# Patient Record
Sex: FEMALE | Employment: FULL TIME | URBAN - METROPOLITAN AREA
[De-identification: names, ages, dates, MRNs, and addresses within clinical notes are randomized per-mention and may not be internally consistent; named-entity substitution may affect disease eponyms.]

---

## 2018-05-29 ENCOUNTER — TRANSFERRED RECORDS (OUTPATIENT)
Dept: HEALTH INFORMATION MANAGEMENT | Facility: CLINIC | Age: 48
End: 2018-05-29

## 2018-06-06 ENCOUNTER — TRANSFERRED RECORDS (OUTPATIENT)
Dept: HEALTH INFORMATION MANAGEMENT | Facility: CLINIC | Age: 48
End: 2018-06-06

## 2018-07-17 RX ORDER — FLUTICASONE PROPIONATE 50 MCG
2 SPRAY, SUSPENSION (ML) NASAL DAILY PRN
COMMUNITY

## 2018-07-18 ENCOUNTER — ANESTHESIA EVENT (OUTPATIENT)
Dept: SURGERY | Facility: CLINIC | Age: 48
End: 2018-07-18
Payer: COMMERCIAL

## 2018-07-18 ENCOUNTER — HOSPITAL ENCOUNTER (OUTPATIENT)
Facility: CLINIC | Age: 48
Discharge: HOME | End: 2018-07-18
Attending: OBSTETRICS & GYNECOLOGY | Admitting: OBSTETRICS & GYNECOLOGY
Payer: COMMERCIAL

## 2018-07-18 ENCOUNTER — SURGERY (OUTPATIENT)
Age: 48
End: 2018-07-18

## 2018-07-18 ENCOUNTER — ANESTHESIA (OUTPATIENT)
Dept: SURGERY | Facility: CLINIC | Age: 48
End: 2018-07-18
Payer: COMMERCIAL

## 2018-07-18 VITALS
BODY MASS INDEX: 25.19 KG/M2 | HEIGHT: 68 IN | WEIGHT: 166.2 LBS | SYSTOLIC BLOOD PRESSURE: 121 MMHG | DIASTOLIC BLOOD PRESSURE: 76 MMHG | OXYGEN SATURATION: 99 % | TEMPERATURE: 97.6 F | RESPIRATION RATE: 16 BRPM

## 2018-07-18 DIAGNOSIS — G89.18 POST-OP PAIN: Primary | ICD-10-CM

## 2018-07-18 LAB
ABO + RH BLD: NORMAL
ABO + RH BLD: NORMAL
B-HCG SERPL-ACNC: 1 IU/L (ref 0–5)
BLD GP AB SCN SERPL QL: NORMAL
BLOOD BANK CMNT PATIENT-IMP: NORMAL
HGB BLD-MCNC: 12 G/DL (ref 11.7–15.7)
SPECIMEN EXP DATE BLD: NORMAL

## 2018-07-18 PROCEDURE — 36415 COLL VENOUS BLD VENIPUNCTURE: CPT | Performed by: OBSTETRICS & GYNECOLOGY

## 2018-07-18 PROCEDURE — 88305 TISSUE EXAM BY PATHOLOGIST: CPT | Performed by: OBSTETRICS & GYNECOLOGY

## 2018-07-18 PROCEDURE — 84702 CHORIONIC GONADOTROPIN TEST: CPT | Performed by: OBSTETRICS & GYNECOLOGY

## 2018-07-18 PROCEDURE — 25000132 ZZH RX MED GY IP 250 OP 250 PS 637: Performed by: ANESTHESIOLOGY

## 2018-07-18 PROCEDURE — 86850 RBC ANTIBODY SCREEN: CPT | Performed by: OBSTETRICS & GYNECOLOGY

## 2018-07-18 PROCEDURE — 27210794 ZZH OR GENERAL SUPPLY STERILE: Performed by: OBSTETRICS & GYNECOLOGY

## 2018-07-18 PROCEDURE — 36000087 ZZH SURGERY LEVEL 8 EA 15 ADDTL MIN: Performed by: OBSTETRICS & GYNECOLOGY

## 2018-07-18 PROCEDURE — 36000085 ZZH SURGERY LEVEL 8 1ST 30 MIN: Performed by: OBSTETRICS & GYNECOLOGY

## 2018-07-18 PROCEDURE — 25000125 ZZHC RX 250: Performed by: NURSE ANESTHETIST, CERTIFIED REGISTERED

## 2018-07-18 PROCEDURE — 37000009 ZZH ANESTHESIA TECHNICAL FEE, EACH ADDTL 15 MIN: Performed by: OBSTETRICS & GYNECOLOGY

## 2018-07-18 PROCEDURE — 71000012 ZZH RECOVERY PHASE 1 LEVEL 1 FIRST HR: Performed by: OBSTETRICS & GYNECOLOGY

## 2018-07-18 PROCEDURE — 25000566 ZZH SEVOFLURANE, EA 15 MIN: Performed by: OBSTETRICS & GYNECOLOGY

## 2018-07-18 PROCEDURE — 27210995 ZZH RX 272: Performed by: OBSTETRICS & GYNECOLOGY

## 2018-07-18 PROCEDURE — 25000128 H RX IP 250 OP 636: Performed by: OBSTETRICS & GYNECOLOGY

## 2018-07-18 PROCEDURE — 71000027 ZZH RECOVERY PHASE 2 EACH 15 MINS: Performed by: OBSTETRICS & GYNECOLOGY

## 2018-07-18 PROCEDURE — 25000128 H RX IP 250 OP 636: Performed by: NURSE ANESTHETIST, CERTIFIED REGISTERED

## 2018-07-18 PROCEDURE — 85018 HEMOGLOBIN: CPT | Performed by: OBSTETRICS & GYNECOLOGY

## 2018-07-18 PROCEDURE — 86901 BLOOD TYPING SEROLOGIC RH(D): CPT | Performed by: OBSTETRICS & GYNECOLOGY

## 2018-07-18 PROCEDURE — 40000170 ZZH STATISTIC PRE-PROCEDURE ASSESSMENT II: Performed by: OBSTETRICS & GYNECOLOGY

## 2018-07-18 PROCEDURE — 86900 BLOOD TYPING SEROLOGIC ABO: CPT | Performed by: OBSTETRICS & GYNECOLOGY

## 2018-07-18 PROCEDURE — 88305 TISSUE EXAM BY PATHOLOGIST: CPT | Mod: 26 | Performed by: OBSTETRICS & GYNECOLOGY

## 2018-07-18 PROCEDURE — 71000013 ZZH RECOVERY PHASE 1 LEVEL 1 EA ADDTL HR: Performed by: OBSTETRICS & GYNECOLOGY

## 2018-07-18 PROCEDURE — 25800025 ZZH RX 258: Performed by: OBSTETRICS & GYNECOLOGY

## 2018-07-18 PROCEDURE — 25000125 ZZHC RX 250: Performed by: OBSTETRICS & GYNECOLOGY

## 2018-07-18 PROCEDURE — 37000008 ZZH ANESTHESIA TECHNICAL FEE, 1ST 30 MIN: Performed by: OBSTETRICS & GYNECOLOGY

## 2018-07-18 PROCEDURE — 25000128 H RX IP 250 OP 636: Performed by: SURGERY

## 2018-07-18 PROCEDURE — C1765 ADHESION BARRIER: HCPCS | Performed by: OBSTETRICS & GYNECOLOGY

## 2018-07-18 RX ORDER — ONDANSETRON 2 MG/ML
INJECTION INTRAMUSCULAR; INTRAVENOUS PRN
Status: DISCONTINUED | OUTPATIENT
Start: 2018-07-18 | End: 2018-07-18

## 2018-07-18 RX ORDER — DEXAMETHASONE SODIUM PHOSPHATE 4 MG/ML
INJECTION, SOLUTION INTRA-ARTICULAR; INTRALESIONAL; INTRAMUSCULAR; INTRAVENOUS; SOFT TISSUE PRN
Status: DISCONTINUED | OUTPATIENT
Start: 2018-07-18 | End: 2018-07-18

## 2018-07-18 RX ORDER — FENTANYL CITRATE 50 UG/ML
25-50 INJECTION, SOLUTION INTRAMUSCULAR; INTRAVENOUS
Status: DISCONTINUED | OUTPATIENT
Start: 2018-07-18 | End: 2018-07-18 | Stop reason: HOSPADM

## 2018-07-18 RX ORDER — ATOVAQUONE AND PROGUANIL HYDROCHLORIDE 250; 100 MG/1; MG/1
1 TABLET, FILM COATED ORAL DAILY
COMMUNITY

## 2018-07-18 RX ORDER — KETOROLAC TROMETHAMINE 30 MG/ML
30 INJECTION, SOLUTION INTRAMUSCULAR; INTRAVENOUS EVERY 6 HOURS PRN
Status: DISCONTINUED | OUTPATIENT
Start: 2018-07-18 | End: 2018-07-18 | Stop reason: HOSPADM

## 2018-07-18 RX ORDER — HYDROCODONE BITARTRATE AND ACETAMINOPHEN 5; 325 MG/1; MG/1
1 TABLET ORAL
Status: DISCONTINUED | OUTPATIENT
Start: 2018-07-18 | End: 2018-07-18 | Stop reason: HOSPADM

## 2018-07-18 RX ORDER — MEPERIDINE HYDROCHLORIDE 25 MG/ML
12.5 INJECTION INTRAMUSCULAR; INTRAVENOUS; SUBCUTANEOUS
Status: DISCONTINUED | OUTPATIENT
Start: 2018-07-18 | End: 2018-07-18 | Stop reason: HOSPADM

## 2018-07-18 RX ORDER — NALOXONE HYDROCHLORIDE 0.4 MG/ML
.1-.4 INJECTION, SOLUTION INTRAMUSCULAR; INTRAVENOUS; SUBCUTANEOUS
Status: DISCONTINUED | OUTPATIENT
Start: 2018-07-18 | End: 2018-07-18 | Stop reason: HOSPADM

## 2018-07-18 RX ORDER — HYDROCODONE BITARTRATE AND ACETAMINOPHEN 5; 325 MG/1; MG/1
1-2 TABLET ORAL EVERY 4 HOURS PRN
Qty: 20 TABLET | Refills: 0 | Status: SHIPPED | OUTPATIENT
Start: 2018-07-18

## 2018-07-18 RX ORDER — LIDOCAINE HYDROCHLORIDE 20 MG/ML
INJECTION, SOLUTION INFILTRATION; PERINEURAL PRN
Status: DISCONTINUED | OUTPATIENT
Start: 2018-07-18 | End: 2018-07-18

## 2018-07-18 RX ORDER — CEFAZOLIN SODIUM 1 G/3ML
1 INJECTION, POWDER, FOR SOLUTION INTRAMUSCULAR; INTRAVENOUS SEE ADMIN INSTRUCTIONS
Status: DISCONTINUED | OUTPATIENT
Start: 2018-07-18 | End: 2018-07-18 | Stop reason: HOSPADM

## 2018-07-18 RX ORDER — MAGNESIUM HYDROXIDE 1200 MG/15ML
LIQUID ORAL PRN
Status: DISCONTINUED | OUTPATIENT
Start: 2018-07-18 | End: 2018-07-18 | Stop reason: HOSPADM

## 2018-07-18 RX ORDER — PROPOFOL 10 MG/ML
INJECTION, EMULSION INTRAVENOUS CONTINUOUS PRN
Status: DISCONTINUED | OUTPATIENT
Start: 2018-07-18 | End: 2018-07-18

## 2018-07-18 RX ORDER — LABETALOL HYDROCHLORIDE 5 MG/ML
10 INJECTION, SOLUTION INTRAVENOUS
Status: DISCONTINUED | OUTPATIENT
Start: 2018-07-18 | End: 2018-07-18 | Stop reason: HOSPADM

## 2018-07-18 RX ORDER — PROPOFOL 10 MG/ML
INJECTION, EMULSION INTRAVENOUS PRN
Status: DISCONTINUED | OUTPATIENT
Start: 2018-07-18 | End: 2018-07-18

## 2018-07-18 RX ORDER — SODIUM CHLORIDE, SODIUM LACTATE, POTASSIUM CHLORIDE, CALCIUM CHLORIDE 600; 310; 30; 20 MG/100ML; MG/100ML; MG/100ML; MG/100ML
INJECTION, SOLUTION INTRAVENOUS CONTINUOUS
Status: DISCONTINUED | OUTPATIENT
Start: 2018-07-18 | End: 2018-07-18 | Stop reason: HOSPADM

## 2018-07-18 RX ORDER — BUPIVACAINE HYDROCHLORIDE AND EPINEPHRINE 5; 5 MG/ML; UG/ML
INJECTION, SOLUTION PERINEURAL PRN
Status: DISCONTINUED | OUTPATIENT
Start: 2018-07-18 | End: 2018-07-18 | Stop reason: HOSPADM

## 2018-07-18 RX ORDER — HYDROCODONE BITARTRATE AND ACETAMINOPHEN 5; 325 MG/1; MG/1
1 TABLET ORAL ONCE
Status: COMPLETED | OUTPATIENT
Start: 2018-07-18 | End: 2018-07-18

## 2018-07-18 RX ORDER — FENTANYL CITRATE 50 UG/ML
INJECTION, SOLUTION INTRAMUSCULAR; INTRAVENOUS PRN
Status: DISCONTINUED | OUTPATIENT
Start: 2018-07-18 | End: 2018-07-18

## 2018-07-18 RX ORDER — GLYCOPYRROLATE 0.2 MG/ML
INJECTION, SOLUTION INTRAMUSCULAR; INTRAVENOUS PRN
Status: DISCONTINUED | OUTPATIENT
Start: 2018-07-18 | End: 2018-07-18

## 2018-07-18 RX ORDER — ONDANSETRON 2 MG/ML
4 INJECTION INTRAMUSCULAR; INTRAVENOUS EVERY 30 MIN PRN
Status: DISCONTINUED | OUTPATIENT
Start: 2018-07-18 | End: 2018-07-18 | Stop reason: HOSPADM

## 2018-07-18 RX ORDER — HYDROMORPHONE HYDROCHLORIDE 1 MG/ML
.3-.5 INJECTION, SOLUTION INTRAMUSCULAR; INTRAVENOUS; SUBCUTANEOUS EVERY 10 MIN PRN
Status: DISCONTINUED | OUTPATIENT
Start: 2018-07-18 | End: 2018-07-18 | Stop reason: HOSPADM

## 2018-07-18 RX ORDER — VECURONIUM BROMIDE 1 MG/ML
INJECTION, POWDER, LYOPHILIZED, FOR SOLUTION INTRAVENOUS PRN
Status: DISCONTINUED | OUTPATIENT
Start: 2018-07-18 | End: 2018-07-18

## 2018-07-18 RX ORDER — CEFAZOLIN SODIUM 2 G/100ML
2 INJECTION, SOLUTION INTRAVENOUS
Status: COMPLETED | OUTPATIENT
Start: 2018-07-18 | End: 2018-07-18

## 2018-07-18 RX ORDER — SODIUM CHLORIDE, SODIUM LACTATE, POTASSIUM CHLORIDE, CALCIUM CHLORIDE 600; 310; 30; 20 MG/100ML; MG/100ML; MG/100ML; MG/100ML
INJECTION, SOLUTION INTRAVENOUS CONTINUOUS PRN
Status: DISCONTINUED | OUTPATIENT
Start: 2018-07-18 | End: 2018-07-18

## 2018-07-18 RX ORDER — ONDANSETRON 4 MG/1
4 TABLET, ORALLY DISINTEGRATING ORAL EVERY 30 MIN PRN
Status: DISCONTINUED | OUTPATIENT
Start: 2018-07-18 | End: 2018-07-18 | Stop reason: HOSPADM

## 2018-07-18 RX ORDER — NEOSTIGMINE METHYLSULFATE 1 MG/ML
VIAL (ML) INJECTION PRN
Status: DISCONTINUED | OUTPATIENT
Start: 2018-07-18 | End: 2018-07-18

## 2018-07-18 RX ADMIN — ROCURONIUM BROMIDE 50 MG: 10 INJECTION INTRAVENOUS at 07:34

## 2018-07-18 RX ADMIN — FENTANYL CITRATE 50 MCG: 50 INJECTION, SOLUTION INTRAMUSCULAR; INTRAVENOUS at 07:31

## 2018-07-18 RX ADMIN — KETOROLAC TROMETHAMINE 30 MG: 30 INJECTION, SOLUTION INTRAMUSCULAR at 12:03

## 2018-07-18 RX ADMIN — SODIUM CHLORIDE 1000 ML: 900 IRRIGANT IRRIGATION at 08:23

## 2018-07-18 RX ADMIN — LIDOCAINE HYDROCHLORIDE 100 MG: 20 INJECTION, SOLUTION INFILTRATION; PERINEURAL at 07:34

## 2018-07-18 RX ADMIN — FENTANYL CITRATE 50 MCG: 50 INJECTION, SOLUTION INTRAMUSCULAR; INTRAVENOUS at 07:57

## 2018-07-18 RX ADMIN — BUPIVACAINE HYDROCHLORIDE AND EPINEPHRINE BITARTRATE 30 ML: 5; .005 INJECTION, SOLUTION PERINEURAL at 10:48

## 2018-07-18 RX ADMIN — GLYCOPYRROLATE 0.7 MG: 0.2 INJECTION, SOLUTION INTRAMUSCULAR; INTRAVENOUS at 10:50

## 2018-07-18 RX ADMIN — VECURONIUM BROMIDE 3 MG: 1 INJECTION, POWDER, LYOPHILIZED, FOR SOLUTION INTRAVENOUS at 08:20

## 2018-07-18 RX ADMIN — NEOSTIGMINE METHYLSULFATE 3.5 MG: 1 INJECTION, SOLUTION INTRAVENOUS at 10:50

## 2018-07-18 RX ADMIN — PHENYLEPHRINE HYDROCHLORIDE 100 MCG: 10 INJECTION, SOLUTION INTRAMUSCULAR; INTRAVENOUS; SUBCUTANEOUS at 08:39

## 2018-07-18 RX ADMIN — SODIUM CHLORIDE, POTASSIUM CHLORIDE, SODIUM LACTATE AND CALCIUM CHLORIDE: 600; 310; 30; 20 INJECTION, SOLUTION INTRAVENOUS at 10:50

## 2018-07-18 RX ADMIN — SODIUM CHLORIDE, POTASSIUM CHLORIDE, SODIUM LACTATE AND CALCIUM CHLORIDE: 600; 310; 30; 20 INJECTION, SOLUTION INTRAVENOUS at 07:30

## 2018-07-18 RX ADMIN — FENTANYL CITRATE 100 MCG: 50 INJECTION, SOLUTION INTRAMUSCULAR; INTRAVENOUS at 07:34

## 2018-07-18 RX ADMIN — PROPOFOL 20 MCG/KG/MIN: 10 INJECTION, EMULSION INTRAVENOUS at 07:45

## 2018-07-18 RX ADMIN — HYDROCODONE BITARTRATE AND ACETAMINOPHEN 1 TABLET: 5; 325 TABLET ORAL at 12:45

## 2018-07-18 RX ADMIN — MIDAZOLAM 2 MG: 1 INJECTION INTRAMUSCULAR; INTRAVENOUS at 07:31

## 2018-07-18 RX ADMIN — ONDANSETRON 4 MG: 2 INJECTION INTRAMUSCULAR; INTRAVENOUS at 10:50

## 2018-07-18 RX ADMIN — ONDANSETRON 4 MG: 2 INJECTION INTRAMUSCULAR; INTRAVENOUS at 08:00

## 2018-07-18 RX ADMIN — DEXAMETHASONE SODIUM PHOSPHATE 4 MG: 4 INJECTION, SOLUTION INTRA-ARTICULAR; INTRALESIONAL; INTRAMUSCULAR; INTRAVENOUS; SOFT TISSUE at 08:00

## 2018-07-18 RX ADMIN — HYDROMORPHONE HYDROCHLORIDE 0.25 MG: 1 INJECTION, SOLUTION INTRAMUSCULAR; INTRAVENOUS; SUBCUTANEOUS at 10:08

## 2018-07-18 RX ADMIN — HYDROMORPHONE HYDROCHLORIDE 0.25 MG: 1 INJECTION, SOLUTION INTRAMUSCULAR; INTRAVENOUS; SUBCUTANEOUS at 09:56

## 2018-07-18 RX ADMIN — CEFAZOLIN SODIUM 2 G: 2 INJECTION, SOLUTION INTRAVENOUS at 07:45

## 2018-07-18 RX ADMIN — VASOPRESSIN 8 ML: 20 INJECTION INTRAVENOUS at 10:44

## 2018-07-18 RX ADMIN — PHENYLEPHRINE HYDROCHLORIDE 100 MCG: 10 INJECTION, SOLUTION INTRAMUSCULAR; INTRAVENOUS; SUBCUTANEOUS at 08:30

## 2018-07-18 RX ADMIN — VECURONIUM BROMIDE 1 MG: 1 INJECTION, POWDER, LYOPHILIZED, FOR SOLUTION INTRAVENOUS at 09:44

## 2018-07-18 RX ADMIN — PHENYLEPHRINE HYDROCHLORIDE 100 MCG: 10 INJECTION, SOLUTION INTRAMUSCULAR; INTRAVENOUS; SUBCUTANEOUS at 07:42

## 2018-07-18 RX ADMIN — CEFAZOLIN SODIUM 1 G: 2 INJECTION, SOLUTION INTRAVENOUS at 09:45

## 2018-07-18 RX ADMIN — SODIUM CHLORIDE, POTASSIUM CHLORIDE, SODIUM LACTATE AND CALCIUM CHLORIDE: 600; 310; 30; 20 INJECTION, SOLUTION INTRAVENOUS at 08:14

## 2018-07-18 RX ADMIN — PROPOFOL 200 MG: 10 INJECTION, EMULSION INTRAVENOUS at 07:34

## 2018-07-18 NOTE — BRIEF OP NOTE
[facility] Brief Operative Note    Pre-operative diagnosis: FIBROIDS   Post-operative diagnosis Same   Procedure: Procedure(s):  DAVINCI MYOMECTOMY  - Wound Class: I-Clean   Surgeon(s): Surgeon(s) and Role:     * Adan George MD - Primary     * Rose Marie Palmer PA-C - Assisting   Estimated blood loss: 150 mL    Specimens:   ID Type Source Tests Collected by Time Destination   A : UTERINE FIBROIDS Tissue Other SURGICAL PATHOLOGY EXAM Adan George MD 7/18/2018  9:03 AM       Findings:

## 2018-07-18 NOTE — DISCHARGE INSTRUCTIONS
Today you received Toradol, an antiinflammatory medication similar to Ibuprofen.  You should not take other antiinflammatory medication, such as Ibuprofen, Motrin, Advil, Aleve, Naprosyn, etc, until 6:00 pm tonight.       Same Day Surgery Discharge Instructions for  Sedation and General Anesthesia       It's not unusual to feel dizzy, light-headed or faint for up to 24 hours after surgery or while taking pain medication.  If you have these symptoms: sit for a few minutes before standing and have someone assist you when you get up to walk or use the bathroom.      You should rest and relax for the next 24 hours. We recommend you make arrangements to have an adult stay with you for at least 24 hours after your discharge.  Avoid hazardous and strenuous activity.      DO NOT DRIVE any vehicle or operate mechanical equipment for 24 hours following the end of your surgery.  Even though you may feel normal, your reactions may be affected by the medication you have received.      Do not drink alcoholic beverages for 24 hours following surgery.       Slowly progress to your regular diet as you feel able. It's not unusual to feel nauseated and/or vomit after receiving anesthesia.  If you develop these symptoms, drink clear liquids (apple juice, ginger ale, broth, 7-up, etc. ) until you feel better.  If your nausea and vomiting persists for 24 hours, please notify your surgeon.        All narcotic pain medications, along with inactivity and anesthesia, can cause constipation. Drinking plenty of liquids and increasing fiber intake will help.      For any questions of a medical nature, call your surgeon.      Do not make important decisions for 24 hours.      If you had general anesthesia, you may have a sore throat for a couple of days related to the breathing tube used during surgery.  You may use Cepacol lozenges to help with this discomfort.  If it worsens or if you develop a fever, contact your surgeon.       If you feel  your pain is not well managed with the pain medications prescribed by your surgeon, please contact your surgeon's office to let them know so they can address your concerns.         HOME CARE FOLLOWING LAPAROSCOPY    Diet  You have no restrictions on your diet.  During the evening following surgery, drink plenty of fluids and eat a light supper.    Nausea  The anesthesia may produce some nausea.  If you feel nauseated, stay in bed and try drinking fluids such as 7-Up, tea, or soup.    Discomfort  The amount of discomfort you can expect is very unpredictable.  If you have pain that cannot be controlled with Tylenol or with the prescription you may have received, you should notify your physician.  The following complaints are not uncommon and should not be cause for concern:   1.  Abdominal tenderness; abdominal cramping   2.  Low backache or pains radiating to your shoulders, chest or back. This is a result of the gas used to inflate your abdomen during surgery. Lying flat in bed seems to help relieve this.   3.  Sore throat for a day or two resulting from the anesthesia tube used during surgery.   4.  Black or blue scarlet on your abdomen.     Drainage  You may expect a small amount of drainage from the incision on your abdomen and you may change the bandage when necessary.  You will also have a small amount of vaginal drainage for several days; this is normal and no cause for concern.  If excessive bleeding occurs, notify your physician.    Fever  A low grade fever (not over 100 F) is usual after this procedure.  Do not hesitate to notify your physician if your fever seems excessive.    Stitches  If your stitches are the type that must be removed, your doctor will instruct you to return to their office.    Activity  Rest on the day of surgery then you may resume your normal activity, as tolerated. Avoid heavy lifting for one week.    You may bathe or shower.  Do not douche, and do not use tampons.  If you also had a  D&C, do not resume intercourse until bleeding has ceased.          Emergency Care  Contact your physician if you have any of these problems:   1.  A fever over 100 F   2.  A large amount of bleeding or drainage   3.  Severe pain        **If you have questions or concerns about your procedure,   call Dr. George at [phone]**

## 2018-07-18 NOTE — OR NURSING
Patient unable to void.  Bladder scanned for 24 ml's of urine.  Instructed patient and  (client) that if she is unable to void to sit in a shallow tub of warm water and try to urinate in the tub.  If after 8 hours, has lower abdominal pain/distention to proceed to the Emergency room as she will likely need to have a wolf catheter placed to drain her bladder.  Client verbalizes understanding.

## 2018-07-18 NOTE — ANESTHESIA CARE TRANSFER NOTE
Patient: Francine VALDOVINOS Lavelle    Procedure(s):  DAVINCI MYOMECTOMY  - Wound Class: I-Clean    Diagnosis: FIBROIDS  Diagnosis Additional Information: No value filed.    Anesthesia Type:   General, ETT     Note:  Airway :Face Mask  Patient transferred to:PACU  Comments: Neuromuscular blockade reversed after TOF 4/4, spontaneous respirations, adequate tidal volumes, followed commands to voice, oropharynx suctioned with soft flexible catheter, extubated atraumatically, extubated with suction, airway patent after extubation.  Oxygen via facemask at 10 liters per minute to PACU. Oxygen tubing connected to wall O2 in PACU, SpO2, NiBP, and EKG monitors and alarms on and functioning, Estephanie Hugger warmer connected to patient gown, report on patient's clinical status given to PACU RN, RN questions answered. Handoff Report: Identifed the Patient, Identified the Reponsible Provider, Reviewed the pertinent medical history, Discussed the surgical course, Reviewed Intra-OP anesthesia mangement and issues during anesthesia, Set expectations for post-procedure period and Allowed opportunity for questions and acknowledgement of understanding      Vitals: (Last set prior to Anesthesia Care Transfer)    CRNA VITALS  7/18/2018 1037 - 7/18/2018 1115      7/18/2018             Pulse: 89    SpO2: 100 %    Resp Rate (observed): 10    Resp Rate (set): 10                Electronically Signed By: TAWANDA Solis CRNA  July 18, 2018  11:15 AM

## 2018-07-18 NOTE — IP AVS SNAPSHOT
MRN:[id number]                      After Visit Summary   7/18/2018    Francine Valderrama    MRN: [id number]           Thank you!     Thank you for choosing [facility] for your care. Our goal is always to provide you with excellent care. Hearing back from our patients is one way we can continue to improve our services. Please take a few minutes to complete the written survey that you may receive in the mail after you visit with us. Thank you!        Patient Information     Date Of Birth          1970        Designated Caregiver       Most Recent Value    Caregiver    Will someone help with your care after discharge? yes    Name of designated caregiver Oren Valderrama    Phone number of caregiver [phone]    Caregiver address [address] [place] [id number]      About your hospital stay     You were admitted on:  July 18, 2018 You last received care in the:  [facility] PACU    You were discharged on:  July 18, 2018       Who to Call     For medical emergencies, please call 911.  For non-urgent questions about your medical care, please call your primary care provider or clinic, [phone]  For questions related to your surgery, please call your surgery clinic        Attending Provider     Provider Adan Bedoya MD OB/Gyn       Primary Care Provider Office Phone # Fax #    Ana Avila [phone]      After Care Instructions     Discharge Instructions       Patient to arrange follow up appointment in 2  weeks            No alcohol       NO ALCOHOL for 24 hours post procedure            No driving or operating machinery       No driving or operating machinery until day after procedure                  Further instructions from your care team         Today you received Toradol, an antiinflammatory medication similar to Ibuprofen.  You should not take other antiinflammatory medication, such as Ibuprofen, Motrin, Advil, Aleve, Naprosyn, etc,  until 6:00 pm tonight.       Same Day Surgery Discharge Instructions for  Sedation and General Anesthesia       It's not unusual to feel dizzy, light-headed or faint for up to 24 hours after surgery or while taking pain medication.  If you have these symptoms: sit for a few minutes before standing and have someone assist you when you get up to walk or use the bathroom.      You should rest and relax for the next 24 hours. We recommend you make arrangements to have an adult stay with you for at least 24 hours after your discharge.  Avoid hazardous and strenuous activity.      DO NOT DRIVE any vehicle or operate mechanical equipment for 24 hours following the end of your surgery.  Even though you may feel normal, your reactions may be affected by the medication you have received.      Do not drink alcoholic beverages for 24 hours following surgery.       Slowly progress to your regular diet as you feel able. It's not unusual to feel nauseated and/or vomit after receiving anesthesia.  If you develop these symptoms, drink clear liquids (apple juice, ginger ale, broth, 7-up, etc. ) until you feel better.  If your nausea and vomiting persists for 24 hours, please notify your surgeon.        All narcotic pain medications, along with inactivity and anesthesia, can cause constipation. Drinking plenty of liquids and increasing fiber intake will help.      For any questions of a medical nature, call your surgeon.      Do not make important decisions for 24 hours.      If you had general anesthesia, you may have a sore throat for a couple of days related to the breathing tube used during surgery.  You may use Cepacol lozenges to help with this discomfort.  If it worsens or if you develop a fever, contact your surgeon.       If you feel your pain is not well managed with the pain medications prescribed by your surgeon, please contact your surgeon's office to let them know so they can address your concerns.         HOME CARE  FOLLOWING LAPAROSCOPY    Diet  You have no restrictions on your diet.  During the evening following surgery, drink plenty of fluids and eat a light supper.    Nausea  The anesthesia may produce some nausea.  If you feel nauseated, stay in bed and try drinking fluids such as 7-Up, tea, or soup.    Discomfort  The amount of discomfort you can expect is very unpredictable.  If you have pain that cannot be controlled with Tylenol or with the prescription you may have received, you should notify your physician.  The following complaints are not uncommon and should not be cause for concern:   1.  Abdominal tenderness; abdominal cramping   2.  Low backache or pains radiating to your shoulders, chest or back. This is a result of the gas used to inflate your abdomen during surgery. Lying flat in bed seems to help relieve this.   3.  Sore throat for a day or two resulting from the anesthesia tube used during surgery.   4.  Black or blue scarlet on your abdomen.     Drainage  You may expect a small amount of drainage from the incision on your abdomen and you may change the bandage when necessary.  You will also have a small amount of vaginal drainage for several days; this is normal and no cause for concern.  If excessive bleeding occurs, notify your physician.    Fever  A low grade fever (not over 100 F) is usual after this procedure.  Do not hesitate to notify your physician if your fever seems excessive.    Stitches  If your stitches are the type that must be removed, your doctor will instruct you to return to their office.    Activity  Rest on the day of surgery then you may resume your normal activity, as tolerated. Avoid heavy lifting for one week.    You may bathe or shower.  Do not douche, and do not use tampons.  If you also had a D&C, do not resume intercourse until bleeding has ceased.          Emergency Care  Contact your physician if you have any of these problems:   1.  A fever over 100 F   2.  A large amount of  "bleeding or drainage   3.  Severe pain        **If you have questions or concerns about your procedure,   call Dr. George at [phone]**              Pending Results     Date and Time Order Name Status Description    2018 0904 Surgical pathology exam In process     2018 0553 ABO/Rh type and screen In process             Admission Information     Date & Time Provider Department Dept. Phone    2018 Adan George MD [facility] PACU [phone]      Your Vitals Were     Blood Pressure Temperature Respirations Height Weight Last Period     96.6  F (35.9  C) (Temporal) 14 1.727 m (5' 8\") 75.4 kg (166 lb 3.2 oz) 2018 (Exact Date)    Pulse Oximetry BMI (Body Mass Index)                96% 25.27 kg/m2          [employer] Information     [employer] lets you send messages to your doctor, view your test results, renew your prescriptions, schedule appointments and more. To sign up, go to [email or web address] . Click on \"Log in\" on the left side of the screen, which will take you to the Welcome page. Then click on \"Sign up Now\" on the right side of the page.     You will be asked to enter the access code listed below, as well as some personal information. Please follow the directions to create your username and password.     Your access code is: [email or web address]  Expires: 10/16/2018 11:27 AM     Your access code will  in 90 days. If you need help or a new code, please call your [facility] clinic or [phone].        Care EveryWhere ID     This is your Care EveryWhere ID. This could be used by other organizations to access your [facility] medical records  [id number]        Equal Access to Services     IVÁN KEVIN : Horace Mahan, mark enriquez, andree stringer. So [facility] [phone].    ATENCIÓN: Si habla español, tiene a givens disposición servicios gratuitos de asistencia lingüística. Llame al " [phone].    We comply with applicable federal civil rights laws and [place] laws. We do not discriminate on the basis of race, color, national origin, age, disability, sex, sexual orientation, or gender identity.               Review of your medicines      START taking        Dose / Directions    HYDROcodone-acetaminophen 5-325 MG per tablet   Commonly known as:  NORCO   Used for:  Post-op pain   Notes to Patient:  You had 1 Norco at 12:45 pm today.        Dose:  1-2 tablet   Take 1-2 tablets by mouth every 4 hours as needed for other (Moderate to Severe Pain)   Quantity:  20 tablet   Refills:  0         CONTINUE these medicines which have NOT CHANGED        Dose / Directions    atovaquone-proguanil 250-100 MG per tablet   Commonly known as:  MALARONE        Dose:  1 tablet   Take 1 tablet by mouth daily For 56 days  Started June 12, 2018   Refills:  0       fluticasone 50 MCG/ACT spray   Commonly known as:  FLONASE        Dose:  2 spray   Spray 2 sprays into both nostrils daily as needed   Refills:  0       PRENATAL PO        Dose:  1-2 tablet   Take 1-2 tablets by mouth daily   Refills:  0       VITAMIN C PO        Dose:  1-2 tablet   Take 1-2 tablets by mouth daily   Refills:  0            Where to get your medicines      Some of these will need a paper prescription and others can be bought over the counter. Ask your nurse if you have questions.     Bring a paper prescription for each of these medications     HYDROcodone-acetaminophen 5-325 MG per tablet                Protect others around you: Learn how to safely use, store and throw away your medicines at www.disposemymeds.org.        ANTIBIOTIC INSTRUCTION     You've Been Prescribed an Antibiotic - Now What?  Your healthcare team thinks that you or your loved one might have an infection. Some infections can be treated with antibiotics, which are powerful, life-saving drugs. Like all medications, antibiotics have side effects and should only be used  when necessary. There are some important things you should know about your antibiotic treatment.      Your healthcare team may run tests before you start taking an antibiotic.    Your team may take samples (e.g., from your blood, urine or other areas) to run tests to look for bacteria. These test can be important to determine if you need an antibiotic at all and, if you do, which antibiotic will work best.      Within a few days, your healthcare team might change or even stop your antibiotic.    Your team may start you on an antibiotic while they are working to find out what is making you sick.    Your team might change your antibiotic because test results show that a different antibiotic would be better to treat your infection.    In some cases, once your team has more information, they learn that you do not need an antibiotic at all. They may find out that you don't have an infection, or that the antibiotic you're taking won't work against your infection. For example, an infection caused by a virus can't be treated with antibiotics. Staying on an antibiotic when you don't need it is more likely to be harmful than helpful.      You may experience side effects from your antibiotic.    Like all medications, antibiotics have side effects. Some of these can be serious.    Let you healthcare team know if you have any known allergies when you are admitted to the hospital.    One significant side effect of nearly all antibiotics is the risk of severe and sometimes deadly diarrhea caused by Clostridium difficile (C. Difficile). This occurs when a person takes antibiotics because some good germs are destroyed. Antibiotic use allows C. diificile to take over, putting patients at high risk for this serious infection.    As a patient or caregiver, it is important to understand your or your loved one's antibiotic treatment. It is especially important for caregivers to speak up when patients can't speak for themselves. Here are  some important questions to ask your healthcare team.    What infection is this antibiotic treating and how do you know I have that infection?    What side effects might occur from this antibiotic?    How long will I need to take this antibiotic?    Is it safe to take this antibiotic with other medications or supplements (e.g., vitamins) that I am taking?     Are there any special directions I need to know about taking this antibiotic? For example, should I take it with food?    How will I be monitored to know whether my infection is responding to the antibiotic?    What tests may help to make sure the right antibiotic is prescribed for me?      Information provided by:  www.cdc.gov/getsmart  U.S. Department of Health and Human Services  Centers for disease Control and Prevention  National Center for Emerging and Zoonotic Infectious Diseases  Division of Healthcare Quality Promotion        Information about OPIOIDS     PRESCRIPTION OPIOIDS: WHAT YOU NEED TO KNOW   We gave you an opioid (narcotic) pain medicine. It is important to manage your pain, but opioids are not always the best choice. You should first try all the other options your care team gave you. Take this medicine for as short a time (and as few doses) as possible.     These medicines have risks:    DO NOT drive when on new or higher doses of pain medicine. These medicines can affect your alertness and reaction times, and you could be arrested for driving under the influence (DUI). If you need to use opioids long-term, talk to your care team about driving.    DO NOT operate heave machinery    DO NOT do any other dangerous activities while taking these medicines.     DO NOT drink any alcohol while taking these medicines.      If the opioid prescribed includes acetaminophen, DO NOT take with any other medicines that contain acetaminophen. Read all labels carefully. Look for the word  acetaminophen  or  Tylenol.  Ask your pharmacist if you have questions or  are unsure.    You can get addicted to pain medicines, especially if you have a history of addiction (chemical, alcohol or substance dependence). Talk to your care team about ways to reduce this risk.    Store your pills in a secure place, locked if possible. We will not replace any lost or stolen medicine. If you don t finish your medicine, please throw away (dispose) as directed by your pharmacist. The [place] Pollution Control Agency has more information about safe disposal: https://www.pca.[facility].[place].us/living-green/managing-unwanted-medications.     All opioids tend to cause constipation. Drink plenty of water and eat foods that have a lot of fiber, such as fruits, vegetables, prune juice, apple juice and high-fiber cereal. Take a laxative (Miralax, milk of magnesia, Colace, Senna) if you don t move your bowels at least every other day.              Medication List: This is a list of all your medications and when to take them. Check marks below indicate your daily home schedule. Keep this list as a reference.      Medications           Morning Afternoon Evening Bedtime As Needed    atovaquone-proguanil 250-100 MG per tablet   Commonly known as:  MALARONE   Take 1 tablet by mouth daily For 56 days  Started June 12, 2018                                fluticasone 50 MCG/ACT spray   Commonly known as:  FLONASE   Spray 2 sprays into both nostrils daily as needed                                HYDROcodone-acetaminophen 5-325 MG per tablet   Commonly known as:  NORCO   Take 1-2 tablets by mouth every 4 hours as needed for other (Moderate to Severe Pain)   Last time this was given:  1 tablet on 7/18/2018 12:45 PM   Notes to Patient:  You had 1 Norco at 12:45 pm today.                                PRENATAL PO   Take 1-2 tablets by mouth daily                                VITAMIN C PO   Take 1-2 tablets by mouth daily

## 2018-07-18 NOTE — ANESTHESIA POSTPROCEDURE EVALUATION
Patient: Francine VALDOVINOS Lavelle    Procedure(s):  DAVINCI MYOMECTOMY  - Wound Class: I-Clean    Diagnosis:FIBROIDS  Diagnosis Additional Information: No value filed.    Anesthesia Type:  General, ETT    Note:  Anesthesia Post Evaluation    Patient location during evaluation: PACU  Patient participation: Able to fully participate in evaluation  Level of consciousness: awake and alert  Pain management: adequate  Airway patency: patent  Cardiovascular status: acceptable and hemodynamically stable  Respiratory status: nonlabored ventilation, unassisted and acceptable  Hydration status: acceptable  PONV: none             Last vitals:  Vitals:    07/18/18 1245 07/18/18 1300 07/18/18 1416   BP: 117/79 131/84 121/76   Resp: 13 17 16   Temp: 36.4  C (97.6  F)     SpO2: 99% 96% 99%         Electronically Signed By: Ronnie Darling MD  July 18, 2018  4:20 PM

## 2018-07-18 NOTE — ANESTHESIA PREPROCEDURE EVALUATION
Procedure: Procedure(s):  DAVINCI MYOMECTOMY  Preop diagnosis: FIBROIDS    No Known Allergies  Past Medical History:   Diagnosis Date     Anemia      Past Surgical History:   Procedure Laterality Date     LAPAROSCOPY PROCEDURE UNLISTED  6/8/2010    fibroid removal, hysteroscopy, chromopertubation     ORTHOPEDIC SURGERY  1992    plates, left arm     Social History   Substance Use Topics     Smoking status: Never Smoker     Smokeless tobacco: Never Used     Alcohol use Yes      Comment: occasional     Prior to Admission medications    Medication Sig Start Date End Date Taking? Authorizing Provider   Ascorbic Acid (VITAMIN C PO) Take 1-2 tablets by mouth daily    Yes Reported, Patient   fluticasone (FLONASE) 50 MCG/ACT spray Spray 2 sprays into both nostrils daily as needed    Yes Reported, Patient   mefloquine (LARIAM) 250 MG tablet Take 1 tablet (250 mg) by mouth every 7 days 7/3/14  Yes Ana Avila MD   Prenatal Vit-Fe Fumarate-FA (PRENATAL PO) Take 1-2 tablets by mouth daily   Yes Reported, Patient     Current Facility-Administered Medications Ordered in Epic   Medication Dose Route Frequency Last Rate Last Dose     ceFAZolin (ANCEF) 1 g vial to attach to  ml bag for ADULT or 50 ml bag for PEDS  1 g Intravenous See Admin Instructions         ceFAZolin (ANCEF) intermittent infusion 2 g in 100 mL dextrose PRE-MIX  2 g Intravenous Pre-Op/Pre-procedure x 1 dose         No current [facility]-ordered outpatient prescriptions on file.       Wt Readings from Last 1 Encounters:   07/18/18 75.4 kg (166 lb 3.2 oz)     Temp Readings from Last 1 Encounters:   07/18/18 35.8  C (96.4  F) (Oral)     BP Readings from Last 6 Encounters:   07/18/18 110/66   11/17/15 124/81   07/03/14 97/66   10/03/13 107/72     Pulse Readings from Last 4 Encounters:   11/17/15 80   07/03/14 88   10/03/13 66     Resp Readings from Last 1 Encounters:   07/18/18 16     SpO2 Readings from Last 1 Encounters:   07/18/18 100%     No results for  input(s): NA, POTASSIUM, CHLORIDE, CO2, ANIONGAP, GLC, BUN, CR, HIMANSHU in the last 41870 hours.  No results for input(s): AST, ALT, ALKPHOS, BILITOTAL, LIPASE in the last 67384 hours.  Recent Labs   Lab Test  07/18/18   0612  06/08/10   0716   WBC   --   5.3   HGB  12.0  13.1   PLT   --   234     No results for input(s): ABO, RH in the last 40798 hours.  No results for input(s): INR, PTT in the last 19437 hours.   No results for input(s): TROPI in the last 33135 hours.  No results for input(s): PH, PCO2, PO2, HCO3 in the last 11120 hours.  Recent Labs   Lab Test  06/08/10   0705   HCG  Negative     No results found for this or any previous visit (from the past 744 hour(s)).    RECENT LABS:   ECG:   ECHO:       Anesthesia Evaluation     .             ROS/MED HX    ENT/Pulmonary:     (+)allergic rhinitis, , . .    Neurologic:  - neg neurologic ROS     Cardiovascular:  - neg cardiovascular ROS       METS/Exercise Tolerance:  >4 METS   Hematologic:     (+) Anemia, -      Musculoskeletal:  - neg musculoskeletal ROS       GI/Hepatic:  - neg GI/hepatic ROS       Renal/Genitourinary:  - ROS Renal section negative       Endo:         Psychiatric:  - neg psychiatric ROS       Infectious Disease:         Malignancy:         Other:                     Physical Exam  Normal systems: dental    Airway   Mallampati: I  TM distance: >3 FB  Neck ROM: full    Dental     Cardiovascular   Rhythm and rate: regular and normal      Pulmonary    breath sounds clear to auscultation                    Anesthesia Plan      History & Physical Review  History and physical reviewed and following examination; no interval change.    ASA Status:  2 .    NPO Status:  > 8 hours    Plan for General and ETT with Intravenous and Propofol induction. Maintenance will be Balanced.    PONV prophylaxis:  Ondansetron (or other 5HT-3) and Dexamethasone or Solumedrol  Propofol gtt background  hydromorphone      Postoperative Care  Postoperative pain management:  IV  analgesics.      Consents  Anesthetic plan, risks, benefits and alternatives discussed with:  Patient..                          .

## 2018-07-18 NOTE — OP NOTE
Procedure Date: 2018      INDICATIONS:  The patient is a 47-year-old female with a myomatous uterus who is actually moving forward with in vitro fertilization in [facility] but was told she had to have her fibroids removed prior to doing that procedure.  Ultrasound showed multiple fibroids.  She had 1 low in the uterus that was 5.7 x 4.3 x 6.2 cm, the second is a little more to the left in the lower aspect of the uterus that was 4.1 x 4.1 x 4.5 cm and then a smaller one in the mid portion of the uterus that was 3.7 x 3.6 x 3.8 cm.  We discussed the procedure, and she was interested in moving forward with a da Patrick myomectomy.  She understands the need for  section following this procedure.      OPERATIVE FINDINGS:  The above aforementioned fibroids as well as a fourth subserosal fibroid in the mid portion of the backside of the uterus that we also removed.  Tubes and ovaries looked normal bilaterally.      OPERATIVE PROCEDURE:  After adequate general anesthesia, she was prepped and draped in the usual sterile fashion.  A speculum was placed in the vagina to visualize the cervix.  A single-tooth tenaculum placed on the anterior lip.  A sponge stick was placed underneath the cervix and articulated with the tenaculum to allow for uterine manipulation.  Perineum was draped off the operative field.      A 7 mm supraumbilical incision was made and a Veress needle placed, its correct position assured by the saline drop method and pneumoperitoneum was established by insufflating approximately 3 liters O2.  Trocar was placed through the incision and the camera placed within the abdominal cavity.  She then had 2 accessory trocars placed in the right and left quadrants, basically at the level of the umbilicus just laterally, and then a third accessory trocar placed between the 2 ports on the right side.  The robot was then articulated and the procedure began.  We initially went with the more anterior fibroid that was  visualized, infiltrated the serosal surface with a dilute Pitressin containing solution, opened up the serosal surface, we were able to skeletonize the fibroid out and hold it for later use.        Attention was then turned to the 2 larger posterior fibroids, elevated the uterus out again injecting the serosal surface with Pitressin.  We were able to remove the larger fibroid, the 6 cm one, through an incision in a vertical fashion trying to stay away from the fallopian tubes.  It was deeper, did not breach the uterine cavity.  The smaller fibroid was also posterior and was actually more inferior.  We were able to slightly extend that incision and be able to shell that fibroid out as well which was a little more superficial.        We then closed the uterine incisions using a 2-0 V-Loc in multiple layers and then the serosal surface closed with a 3-0 Vicryl in a baseball stitch fashion starting from the most inferior incision moving up to the anterior aspect of the posterior side.  We then were able to close the posterior defect in the uterus in a similar fashion with a 2-0 V-Loc in running fashion and then the baseball stitch fashion on the serosal surface.  Following the closure of the uterus, there was a small subserosal fibroid on the posterior aspect of the uterus that we then also removed and cauterized the surface.  At that point, the robot was disarticulated.  We extended the left incision minimally and was able to insert a morcellator which the fibroids were then individually morcellized and removed.  We then, following the morselization, wrapped the uterus with Interceed to ideally minimize adhesion formation.  The left abdominal accessory port was then closed using an 0 Vicryl x 2 for the fascial defect.  The remainder of the skin edges were then infiltrated with Marcaine and closed with a 4-0 Vicryl in subcuticular fashion.  The patient tolerated the procedure well.  Estimated blood loss 150 mL.  Sponge  and needle counts were correct.         ANASTASIYA GEORGE MD             D: 2018   T: 2018   MT: [place]      Name:     CHRISTY GENTILE   MRN:      [id number]        Account:        [id number]   :      1970           Procedure Date: 2018      Document: [id number]       cc: Maddison George MD

## 2018-07-18 NOTE — PROGRESS NOTES
Admission medication history interview status for the 7/18/2018  admission is complete. See EPIC admission navigator for prior to admission medications     Medication history source reliability:Good    Medication history interview source(s):Patient    Medication history resources (including written lists, pill bottles, clinic record):None    Primary pharmacy.[facility]    Additional medication history information not noted on PTA med list :  Patient has been taking Malarone due to her travels to [facility]. She is on a 56 day course started on 6/12/18. Ran out of pills on day 30, and has not picked up a refill for the next 26 doses yet.    Time spent in this activity: 45 minutes    Prior to Admission medications    Medication Sig Last Dose Taking? Auth Provider   Ascorbic Acid (VITAMIN C PO) Take 1-2 tablets by mouth daily  7/17/2018 at Unknown time Yes Reported, Patient   atovaquone-proguanil (MALARONE) 250-100 MG per tablet Take 1 tablet by mouth daily For 56 days    Started June 12, 2018 7/12/2018 Yes Reported, Patient   fluticasone (FLONASE) 50 MCG/ACT spray Spray 2 sprays into both nostrils daily as needed  more than a week at prn Yes Reported, Patient   Prenatal Vit-Fe Fumarate-FA (PRENATAL PO) Take 1-2 tablets by mouth daily 7/16/2018 Yes Reported, Patient

## 2018-07-18 NOTE — IP AVS SNAPSHOT
[place] [address]    [place] [address]    Phone:  [phone]                                       After Visit Summary   7/18/2018    Francine Valderrama    MRN: [id number]           After Visit Summary Signature Page     I have received my discharge instructions, and my questions have been answered. I have discussed any challenges I see with this plan with the nurse or doctor.    ..........................................................................................................................................  Patient/Patient Representative Signature      ..........................................................................................................................................  Patient Representative Print Name and Relationship to Patient    ..................................................               ................................................  Date                                            Time    ..........................................................................................................................................  Reviewed by Signature/Title    ...................................................              ..............................................  Date                                                            Time

## 2018-07-19 NOTE — ADDENDUM NOTE
Addendum  created 07/19/18 0622 by Asad Ngo MD    Anesthesia Attestations deleted, Anesthesia Attestations filed

## 2018-07-20 LAB — COPATH REPORT: NORMAL

## 2018-08-05 ENCOUNTER — HEALTH MAINTENANCE LETTER (OUTPATIENT)
Age: 48
End: 2018-08-05

## 2020-07-24 ENCOUNTER — TELEPHONE (OUTPATIENT)
Dept: FAMILY MEDICINE | Facility: CLINIC | Age: 50
End: 2020-07-24

## 2020-07-24 NOTE — TELEPHONE ENCOUNTER
Reason for call:  Other   Patient called regarding (reason for call): appointment question on immunizations  Additional comments: Please have nurse call back regarding questions on immunizations needed for school. Thank you.    Phone number to reach patient:  Home number on file [phone] (home)    Best Time:  anytime    Can we leave a detailed message on this number?  YES    Travel screening: Not Applicable

## 2020-07-24 NOTE — TELEPHONE ENCOUNTER
Called and spoke to patient. Patient stated that she need an appointment to update her immunizations for school. Appointment scheduled for Monday, July 27,2020 @10:20 AM with Fariha Garcia CMA
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
## 2020-07-24 NOTE — TELEPHONE ENCOUNTER
We have never seen this patient before  See if MA can answer any general immunization questions, however, patient likely would need seen as she would be a new patient to us    Jagjit Catherine RN

## 2020-07-27 ENCOUNTER — OFFICE VISIT (OUTPATIENT)
Dept: FAMILY MEDICINE | Facility: CLINIC | Age: 50
End: 2020-07-27
Payer: COMMERCIAL

## 2020-07-27 VITALS
RESPIRATION RATE: 18 BRPM | HEART RATE: 57 BPM | WEIGHT: 173 LBS | SYSTOLIC BLOOD PRESSURE: 124 MMHG | TEMPERATURE: 98.3 F | OXYGEN SATURATION: 96 % | DIASTOLIC BLOOD PRESSURE: 82 MMHG | BODY MASS INDEX: 26.3 KG/M2

## 2020-07-27 DIAGNOSIS — Z00.00 ROUTINE GENERAL MEDICAL EXAMINATION AT A HEALTH CARE FACILITY: Primary | ICD-10-CM

## 2020-07-27 PROCEDURE — 90471 IMMUNIZATION ADMIN: CPT | Performed by: NURSE PRACTITIONER

## 2020-07-27 PROCEDURE — 90734 MENACWYD/MENACWYCRM VACC IM: CPT | Performed by: NURSE PRACTITIONER

## 2020-07-27 PROCEDURE — 86481 TB AG RESPONSE T-CELL SUSP: CPT | Performed by: NURSE PRACTITIONER

## 2020-07-27 PROCEDURE — 36415 COLL VENOUS BLD VENIPUNCTURE: CPT | Performed by: NURSE PRACTITIONER

## 2020-07-27 PROCEDURE — 80307 DRUG TEST PRSMV CHEM ANLYZR: CPT | Performed by: NURSE PRACTITIONER

## 2020-07-27 PROCEDURE — 99386 PREV VISIT NEW AGE 40-64: CPT | Mod: 25 | Performed by: NURSE PRACTITIONER

## 2020-07-27 PROCEDURE — 87389 HIV-1 AG W/HIV-1&-2 AB AG IA: CPT | Performed by: NURSE PRACTITIONER

## 2020-07-27 PROCEDURE — 40000358 ZZHCL STATISTIC DRUG SCREEN MULTIPLE (METRO): Performed by: NURSE PRACTITIONER

## 2020-07-27 NOTE — LETTER
July 30, 2020      Francine Zavaletaplin  [address]  [place] [address]          Dear ,    We are writing to inform you of your test results.  Your HIV screen is negative.       Resulted Orders   Drug screen urine   Result Value Ref Range    Benzodiazepine Qual Urine Negative NEG^Negative      Comment:      Cutoff for a negative benzodiazepine is 200 ng/mL or less.    Cannabinoids Qual Urine Negative NEG^Negative      Comment:      Cutoff for a negative cannabinoid is 50 ng/mL or less.    Cocaine Qual Urine Negative NEG^Negative      Comment:      Cutoff for a negative cocaine is 300 ng/mL or less.    Opiates Qualitative Urine Negative NEG^Negative      Comment:      Cutoff for a negative opiate is 300 ng/mL or less.    Acetaminophen Qual Negative NEG^Negative    Amantadine Qual Negative NEG^Negative    Amitriptyline Qual Negative NEG^Negative    Amoxapine Qual Negative NEG^Negative    Amphetamines Qual Negative NEG^Negative    Atropine Qual Negative NEG^Negative    Bupropion Qual Negative NEG^Negative    Caffeine Qual Positive (A) NEG^Negative    Carbamazepine Qual Negative NEG^Negative    Chlorpheniramine Qual Negative NEG^Negative    Chlorpromazine Qual Negative NEG^Negative    Citalopram Qual Negative NEG^Negative    Clomipramine Qual Negative NEG^Negative    Cocaine Qual Negative NEG^Negative    Codeine Qual Negative NEG^Negative    Desipramine Qual Negative NEG^Negative    Dextromethorphan Qual Negative NEG^Negative    Diphenhydramine Qual Negative NEG^Negative    Doxepin/metabolite Qual Negative NEG^Negative    Doxylamine Qual Negative NEG^Negative    Ephedrine or pseudo Qual Negative NEG^Negative    Fentanyl Qual Negative NEG^Negative    Fluoxetine and metab Qual Negative NEG^Negative    Hydrocodone Qual Negative NEG^Negative    Hydromorphone Qual Negative NEG^Negative    Ibuprofen Qual Negative NEG^Negative    Imipramine Qual Negative NEG^Negative    Ketamine Qual Negative NEG^Negative     Lamotrigine Qual Negative NEG^Negative    Lidocaine Qual Negative NEG^Negative    Loxapine Qual Negative NEG^Negative    Maprotiline Qual Negative NEG^Negative    MDMA Qual Negative NEG^Negative    Meperidine Qual Negative NEG^Negative    Methadone Qual Negative NEG^Negative    Methamphetamine Qual Negative NEG^Negative    Mirtazapine Qual Negative NEG^Negative    Morphine Qual Negative NEG^Negative    Nicotine Qual Negative NEG^Negative    Nortriptyline Qual Negative NEG^Negative    Olanzapine Qual Negative NEG^Negative    Oxycodone Qual Negative NEG^Negative    Pentazocine Qual Negative NEG^Negative    Phencyclidine Qual Negative NEG^Negative    Phentermine Qual Negative NEG^Negative    Propofol Qual Negative NEG^Negative    Propoxyphene Qual Negative NEG^Negative    Propranolol Qual Negative NEG^Negative    Pyrilamine Qual Negative NEG^Negative    Quetiapine Metab Qual Negative NEG^Negative    Salicylate Qual Negative NEG^Negative    Sertraline Qual Negative NEG^Negative    Theobromine Qual Positive (A) NEG^Negative    Trimipramine Qual Negative NEG^Negative    Topiramate Qual Negative NEG^Negative    Tramadol Qual Negative NEG^Negative    Venlafaxine Qual Negative NEG^Negative      Comment:      This test was developed and its performance characteristics determined by the   [facility].   It has not been cleared or approved by the FDA. The laboratory is regulated   under CLIA as qualified to perform high-complexity testing. This test is used   for clinical purposes. It should not be regarded as investigational or for   research.         If you have any questions or concerns, please call the clinic at the number listed above.       Sincerely,      Tanesha Starr MD

## 2020-07-28 LAB — HIV 1+2 AB+HIV1 P24 AG SERPL QL IA: NONREACTIVE

## 2020-07-29 ENCOUNTER — TELEPHONE (OUTPATIENT)
Dept: FAMILY MEDICINE | Facility: CLINIC | Age: 50
End: 2020-07-29

## 2020-07-29 DIAGNOSIS — R76.12 POSITIVE QUANTIFERON-TB GOLD TEST: Primary | ICD-10-CM

## 2020-07-29 LAB
GAMMA INTERFERON BACKGROUND BLD IA-ACNC: 0.1 IU/ML
M TB IFN-G CD4+ BCKGRND COR BLD-ACNC: 9.9 IU/ML
M TB TUBERC IFN-G BLD QL: POSITIVE
MITOGEN IGNF BCKGRD COR BLD-ACNC: 9.9 IU/ML
MITOGEN IGNF BCKGRD COR BLD-ACNC: 9.9 IU/ML

## 2020-07-29 NOTE — TELEPHONE ENCOUNTER
Left message for patient to call RN hotline [phone].     Teodora Ambrosio RN    -----------------------------    Please call patient. Her Quantiferon result is positive for TB antibodies so I have ordered a chest xray as we had discussed at her visit. Please let her know how to schedule.       TAWANDA Lynn, CNP       Teodora Ambrosio RN

## 2020-07-30 LAB
ACETAMINOPHEN QUAL: NEGATIVE
AMANTADINE: NEGATIVE
AMITRIPTYLINE QUAL: NEGATIVE
AMOXAPINE: NEGATIVE
AMPHETAMINES QUAL: NEGATIVE
ATROPINE: NEGATIVE
BENZODIAZ UR QL: NEGATIVE
BUPROPION QUAL: NEGATIVE
CAFFEINE QUAL: POSITIVE
CANNABINOIDS UR QL SCN: NEGATIVE
CARBAMAZEPINE QUAL: NEGATIVE
CHLORPHENIRAMINE: NEGATIVE
CHLORPROMAZINE: NEGATIVE
CITALOPRAM QUAL: NEGATIVE
CLOMIPRAMINE QUAL: NEGATIVE
COCAINE QUAL: NEGATIVE
COCAINE UR QL: NEGATIVE
CODEINE QUAL: NEGATIVE
DESIPRAMINE QUAL: NEGATIVE
DEXTROMETHORPHAN: NEGATIVE
DIPHENHYDRAMINE: NEGATIVE
DOXEPIN/METABOLITE: NEGATIVE
DOXYLAMINE: NEGATIVE
EPHEDRINE OR PSEUDO: NEGATIVE
FENTANYL QUAL: NEGATIVE
FLUOXETINE AND METAB: NEGATIVE
HYDROCODONE QUAL: NEGATIVE
HYDROMORPHONE QUAL: NEGATIVE
IBUPROFEN QUAL: NEGATIVE
IMIPRAMINE QUAL: NEGATIVE
KETAMINE QUAL: NEGATIVE
LAMOTRIGINE QUAL: NEGATIVE
LIDOCAIN SPEC QL: NEGATIVE
LOXAPINE: NEGATIVE
MAPROTYLINE: NEGATIVE
MDMA QUAL: NEGATIVE
MEPERIDINE QUAL: NEGATIVE
METHAMPHETAMINE: NEGATIVE
METHODONE QUAL: NEGATIVE
MIRTAZAPINE QUAL: NEGATIVE
MORPHINE QUAL: NEGATIVE
NICOTINE: NEGATIVE
NORTRIPTYLINE QUAL: NEGATIVE
OLANZAPINE QUAL: NEGATIVE
OPIATES UR QL SCN: NEGATIVE
OXYCODONE QUAL: NEGATIVE
PENTAZOCINE: NEGATIVE
PHENCYCLIDINE QUAL: NEGATIVE
PHENTERMINE: NEGATIVE
PROPOFOL QUAL: NEGATIVE
PROPOXPHENE QUAL: NEGATIVE
PROPRANOLOL QUAL: NEGATIVE
PYRILAMINE: NEGATIVE
QUETIAPINE METAB QUAL: NEGATIVE
SALICYLATE QUAL: NEGATIVE
SERTRALINE QUAL: NEGATIVE
THEOBROMINE: POSITIVE
TOPIRAMATE QUAL: NEGATIVE
TRAMADOL QUAL: NEGATIVE
TRIMIPRAMINE QUAL: NEGATIVE
VENLAFAXINE QUAL: NEGATIVE

## 2020-07-30 NOTE — TELEPHONE ENCOUNTER
Pt was given provider's message as written. She will come in for x ray on Monday.    Kenya Chen RN  [facility]

## 2020-11-22 ENCOUNTER — HEALTH MAINTENANCE LETTER (OUTPATIENT)
Age: 50
End: 2020-11-22

## 2021-01-15 ENCOUNTER — HEALTH MAINTENANCE LETTER (OUTPATIENT)
Age: 51
End: 2021-01-15

## 2021-09-18 ENCOUNTER — HEALTH MAINTENANCE LETTER (OUTPATIENT)
Age: 51
End: 2021-09-18

## 2022-03-05 ENCOUNTER — HEALTH MAINTENANCE LETTER (OUTPATIENT)
Age: 52
End: 2022-03-05

## 2022-11-20 ENCOUNTER — HEALTH MAINTENANCE LETTER (OUTPATIENT)
Age: 52
End: 2022-11-20

## 2023-04-15 ENCOUNTER — HEALTH MAINTENANCE LETTER (OUTPATIENT)
Age: 53
End: 2023-04-15

## 2023-11-25 ENCOUNTER — HEALTH MAINTENANCE LETTER (OUTPATIENT)
Age: 53
End: 2023-11-25

## 2025-05-19 ENCOUNTER — LAB REQUISITION (OUTPATIENT)
Dept: LAB | Facility: CLINIC | Age: 55
End: 2025-05-19
Payer: COMMERCIAL

## 2025-05-19 DIAGNOSIS — Z12.4 ENCOUNTER FOR SCREENING FOR MALIGNANT NEOPLASM OF CERVIX: ICD-10-CM

## 2025-05-19 PROCEDURE — 87624 HPV HI-RISK TYP POOLED RSLT: CPT | Mod: ORL | Performed by: OBSTETRICS & GYNECOLOGY

## 2025-05-19 PROCEDURE — G0145 SCR C/V CYTO,THINLAYER,RESCR: HCPCS | Mod: ORL | Performed by: OBSTETRICS & GYNECOLOGY

## 2025-05-20 LAB
HPV HR 12 DNA CVX QL NAA+PROBE: NEGATIVE
HPV16 DNA CVX QL NAA+PROBE: NEGATIVE
HPV18 DNA CVX QL NAA+PROBE: NEGATIVE
HUMAN PAPILLOMA VIRUS FINAL DIAGNOSIS: NORMAL

## 2025-05-22 LAB
BKR AP ASSOCIATED HPV REPORT: NORMAL
BKR LAB AP GYN ADEQUACY: NORMAL
BKR LAB AP GYN INTERPRETATION: NORMAL
BKR LAB AP LMP: NORMAL
BKR LAB AP PREVIOUS ABNL DX: NORMAL
BKR LAB AP PREVIOUS ABNORMAL: NORMAL
PATH REPORT.COMMENTS IMP SPEC: NORMAL
PATH REPORT.COMMENTS IMP SPEC: NORMAL
PATH REPORT.RELEVANT HX SPEC: NORMAL

## (undated) DEVICE — SU VICRYL 0 UR-6 27" J603H

## (undated) DEVICE — BARRIER INTERCEED 3X4" 4350

## (undated) DEVICE — DAVINCI S GRASPER ENDOWRIST PROGRASP 420093

## (undated) DEVICE — GLOVE PROTEXIS W/NEU-THERA 8.0  2D73TE80

## (undated) DEVICE — SU WND CLOSURE VLOC 180 ABS 2-0 9" GS-21 VLOCL0345

## (undated) DEVICE — SUCTION CANISTER MEDIVAC LINER 3000ML W/LID 65651-530

## (undated) DEVICE — SU VICRYL 4-0 PS-2 18" UND J496H

## (undated) DEVICE — DAVINCI HOT SHEARS TIP COVER  400180

## (undated) DEVICE — CATH TRAY FOLEY SURESTEP 16FR WDRAIN BAG STLK LATEX A300316A

## (undated) DEVICE — DECANTER BAG 2002S

## (undated) DEVICE — SOL NACL 0.9% INJ 250ML BAG 2B1322Q

## (undated) DEVICE — LINEN TOWEL PACK X5 5464

## (undated) DEVICE — WIPES FOLEY CARE SURESTEP PROVON DFC100

## (undated) DEVICE — SUCTION IRR STRYKERFLOW II W/TIP 250-070-520

## (undated) DEVICE — SYR 10ML LL W/O NDL

## (undated) DEVICE — SOL WATER IRRIG 1000ML BOTTLE 2F7114

## (undated) DEVICE — DAVINCI S NDL DRIVER MEGA 420194

## (undated) DEVICE — ESU CORD MONOPOLAR 10'  E0510

## (undated) DEVICE — PACK DAVINCI GYN SMA15GDFS1

## (undated) DEVICE — DAVINCI SI DRAPE ACCESSORY KIT 3-ARM 420290

## (undated) DEVICE — SU WND CLOSURE VLOC 180 ABS 3-0 6" V-20 VLOCL0604

## (undated) DEVICE — DAVINCI S NDL DRIVER LARGE 420006

## (undated) DEVICE — DAVINCI S FORCEPS PK DISSECTING 420227

## (undated) DEVICE — DAVINCI OBTURATOR 8MM BLADELESS 420023

## (undated) DEVICE — TUBING CONMED AIRSEAL SMOKE EVAC INSUFFLATION ASM-EVAC

## (undated) DEVICE — KIT PATIENT POSITIONING PIGAZZI LATEX FREE 40580

## (undated) DEVICE — Device

## (undated) DEVICE — GLOVE PROTEXIS BLUE W/NEU-THERA 8.0  2D73EB80

## (undated) DEVICE — DAVINCI S CANNULA SEAL 8.5-13MM 420206

## (undated) DEVICE — DECANTER VIAL 2006S

## (undated) DEVICE — DAVINCI S MONOPOLAR SCISSORS PRECURVED HOT SHEARS 420179

## (undated) DEVICE — DAVINCI FORCEPS PK DISSECTING CORD  400229

## (undated) DEVICE — GOWN XXLG REINFORCED 9071EL

## (undated) DEVICE — SU WND CLOSURE VLOC 180 ABS 3-0 12" V-20 VLOCL0614

## (undated) DEVICE — SYR 50ML LL W/O NDL 309653

## (undated) DEVICE — NDL INSUFFLATION 13GA 120MM C2201

## (undated) DEVICE — ENDO TROCAR CONMED AIRSEAL BLADELESS 08X120MM IAS8-120LP

## (undated) RX ORDER — PROPOFOL 10 MG/ML
INJECTION, EMULSION INTRAVENOUS
Status: DISPENSED
Start: 2018-07-18

## (undated) RX ORDER — VECURONIUM BROMIDE 1 MG/ML
INJECTION, POWDER, LYOPHILIZED, FOR SOLUTION INTRAVENOUS
Status: DISPENSED
Start: 2018-07-18

## (undated) RX ORDER — GLYCOPYRROLATE 0.2 MG/ML
INJECTION, SOLUTION INTRAMUSCULAR; INTRAVENOUS
Status: DISPENSED
Start: 2018-07-18

## (undated) RX ORDER — HYDROCODONE BITARTRATE AND ACETAMINOPHEN 5; 325 MG/1; MG/1
TABLET ORAL
Status: DISPENSED
Start: 2018-07-18

## (undated) RX ORDER — DEXAMETHASONE SODIUM PHOSPHATE 4 MG/ML
INJECTION, SOLUTION INTRA-ARTICULAR; INTRALESIONAL; INTRAMUSCULAR; INTRAVENOUS; SOFT TISSUE
Status: DISPENSED
Start: 2018-07-18

## (undated) RX ORDER — KETOROLAC TROMETHAMINE 30 MG/ML
INJECTION, SOLUTION INTRAMUSCULAR; INTRAVENOUS
Status: DISPENSED
Start: 2018-07-18

## (undated) RX ORDER — FENTANYL CITRATE 50 UG/ML
INJECTION, SOLUTION INTRAMUSCULAR; INTRAVENOUS
Status: DISPENSED
Start: 2018-07-18

## (undated) RX ORDER — ONDANSETRON 2 MG/ML
INJECTION INTRAMUSCULAR; INTRAVENOUS
Status: DISPENSED
Start: 2018-07-18

## (undated) RX ORDER — BUPIVACAINE HYDROCHLORIDE AND EPINEPHRINE 5; 5 MG/ML; UG/ML
INJECTION, SOLUTION EPIDURAL; INTRACAUDAL; PERINEURAL
Status: DISPENSED
Start: 2018-07-18

## (undated) RX ORDER — LIDOCAINE HYDROCHLORIDE 20 MG/ML
INJECTION, SOLUTION EPIDURAL; INFILTRATION; INTRACAUDAL; PERINEURAL
Status: DISPENSED
Start: 2018-07-18

## (undated) RX ORDER — CEFAZOLIN SODIUM 2 G/100ML
INJECTION, SOLUTION INTRAVENOUS
Status: DISPENSED
Start: 2018-07-18

## (undated) RX ORDER — NEOSTIGMINE METHYLSULFATE 1 MG/ML
VIAL (ML) INJECTION
Status: DISPENSED
Start: 2018-07-18

## (undated) RX ORDER — CEFAZOLIN SODIUM 1 G/3ML
INJECTION, POWDER, FOR SOLUTION INTRAMUSCULAR; INTRAVENOUS
Status: DISPENSED
Start: 2018-07-18

## (undated) RX ORDER — HYDROMORPHONE HYDROCHLORIDE 1 MG/ML
INJECTION, SOLUTION INTRAMUSCULAR; INTRAVENOUS; SUBCUTANEOUS
Status: DISPENSED
Start: 2018-07-18